# Patient Record
Sex: FEMALE
[De-identification: names, ages, dates, MRNs, and addresses within clinical notes are randomized per-mention and may not be internally consistent; named-entity substitution may affect disease eponyms.]

---

## 2017-01-04 ENCOUNTER — HOSPITAL (OUTPATIENT)
Dept: OTHER | Age: 57
End: 2017-01-04
Attending: INTERNAL MEDICINE

## 2017-01-04 ENCOUNTER — HOSPITAL (OUTPATIENT)
Dept: OTHER | Age: 57
End: 2017-01-04
Attending: EMERGENCY MEDICINE

## 2017-01-04 LAB
ANALYZER ANC (IANC): NORMAL
ANION GAP SERPL CALC-SCNC: 16 MMOL/L (ref 10–20)
BASOPHILS # BLD: 0 THOUSAND/MCL (ref 0–0.3)
BASOPHILS NFR BLD: 0 %
BNP SERPL-MCNC: 20 PG/ML
BUN SERPL-MCNC: 14 MG/DL (ref 10–20)
BUN/CREAT SERPL: 21 (ref 7–25)
CALCIUM SERPL-MCNC: 8.7 MG/DL (ref 8.4–10.2)
CHLORIDE: 103 MMOL/L (ref 98–107)
CO2 SERPL-SCNC: 25 MMOL/L (ref 21–32)
CREAT SERPL-MCNC: 0.66 MG/DL (ref 0.51–0.95)
D DIMER PPP FEU-MCNC: 0.24 MG/L FEU
DIFFERENTIAL METHOD BLD: NORMAL
EOSINOPHIL # BLD: 0.1 THOUSAND/MCL (ref 0.1–0.5)
EOSINOPHIL NFR BLD: 1 %
ERYTHROCYTE [DISTWIDTH] IN BLOOD: 12.5 % (ref 11–15)
GLUCOSE SERPL-MCNC: 97 MG/DL (ref 65–99)
HEMATOCRIT: 40.6 % (ref 36–46.5)
HGB BLD-MCNC: 13.7 GM/DL (ref 12–15.5)
LYMPHOCYTES # BLD: 1.9 THOUSAND/MCL (ref 1–4)
LYMPHOCYTES NFR BLD: 28 %
MCH RBC QN AUTO: 30.6 PG (ref 26–34)
MCHC RBC AUTO-ENTMCNC: 33.7 GM/DL (ref 32–36.5)
MCV RBC AUTO: 90.6 FL (ref 78–100)
MONOCYTES # BLD: 0.3 THOUSAND/MCL (ref 0.3–0.9)
MONOCYTES NFR BLD: 4 %
NEUTROPHILS # BLD: 4.8 THOUSAND/MCL (ref 1.8–7.7)
NEUTROPHILS NFR BLD: 67 %
NEUTS SEG NFR BLD: NORMAL %
PERCENT NRBC: NORMAL
PLATELET # BLD: 271 THOUSAND/MCL (ref 140–450)
POTASSIUM SERPL-SCNC: 3.6 MMOL/L (ref 3.4–5.1)
RBC # BLD: 4.48 MILLION/MCL (ref 4–5.2)
SODIUM SERPL-SCNC: 140 MMOL/L (ref 135–145)
TROPONIN I SERPL HS-MCNC: <0.02 NG/ML
WBC # BLD: 7 THOUSAND/MCL (ref 4.2–11)

## 2017-01-05 ENCOUNTER — DIAGNOSTIC TRANS (OUTPATIENT)
Dept: OTHER | Age: 57
End: 2017-01-05

## 2017-01-05 LAB
ALBUMIN SERPL-MCNC: 4.1 GM/DL (ref 3.6–5.1)
ALBUMIN/GLOB SERPL: 1.3 {RATIO} (ref 1–2.4)
ALP SERPL-CCNC: 64 UNIT/L (ref 45–117)
ALT SERPL-CCNC: 74 UNIT/L
ANALYZER ANC (IANC): NORMAL
ANION GAP SERPL CALC-SCNC: 14 MMOL/L (ref 10–20)
AST SERPL-CCNC: 35 UNIT/L
BILIRUB SERPL-MCNC: 0.7 MG/DL (ref 0.2–1)
BUN SERPL-MCNC: 13 MG/DL (ref 10–20)
BUN/CREAT SERPL: 20 (ref 7–25)
CALCIUM SERPL-MCNC: 8.6 MG/DL (ref 8.4–10.2)
CHLORIDE: 105 MMOL/L (ref 98–107)
CHOLEST SERPL-MCNC: 230 MG/DL (ref 100–200)
CHOLEST/HDLC SERPL: 7.2 {RATIO}
CO2 SERPL-SCNC: 26 MMOL/L (ref 21–32)
CREAT SERPL-MCNC: 0.65 MG/DL (ref 0.51–0.95)
ERYTHROCYTE [DISTWIDTH] IN BLOOD: 12.4 % (ref 11–15)
GLOBULIN SER-MCNC: 3.2 GM/DL (ref 2–4)
GLUCOSE SERPL-MCNC: 112 MG/DL (ref 65–99)
GLYCOHEMOGLOBIN: 5.7 % (ref 4.5–5.6)
HDLC SERPL-MCNC: 32 MG/DL
HEMATOCRIT: 39 % (ref 36–46.5)
HGB BLD-MCNC: 13 GM/DL (ref 12–15.5)
LDLC SERPL CALC-MCNC: 152 MG/DL
MAGNESIUM SERPL-MCNC: 2.2 MG/DL (ref 1.7–2.4)
MCH RBC QN AUTO: 30.4 PG (ref 26–34)
MCHC RBC AUTO-ENTMCNC: 33.3 GM/DL (ref 32–36.5)
MCV RBC AUTO: 91.1 FL (ref 78–100)
NONHDLC SERPL-MCNC: 198 MG/DL
PLATELET # BLD: 260 THOUSAND/MCL (ref 140–450)
POTASSIUM SERPL-SCNC: 3.9 MMOL/L (ref 3.4–5.1)
PROT SERPL-MCNC: 7.3 GM/DL (ref 6.4–8.2)
RBC # BLD: 4.28 MILLION/MCL (ref 4–5.2)
SODIUM SERPL-SCNC: 141 MMOL/L (ref 135–145)
TRIGL SERPL-MCNC: 231 MG/DL
TROPONIN I SERPL HS-MCNC: <0.02 NG/ML
WBC # BLD: 7.2 THOUSAND/MCL (ref 4.2–11)

## 2017-08-30 ENCOUNTER — HOSPITAL (OUTPATIENT)
Dept: OTHER | Age: 57
End: 2017-08-30
Attending: EMERGENCY MEDICINE

## 2019-06-11 ENCOUNTER — OFFICE VISIT (OUTPATIENT)
Dept: FAMILY MEDICINE CLINIC | Facility: CLINIC | Age: 59
End: 2019-06-11
Payer: COMMERCIAL

## 2019-06-11 VITALS
TEMPERATURE: 98 F | DIASTOLIC BLOOD PRESSURE: 80 MMHG | SYSTOLIC BLOOD PRESSURE: 170 MMHG | WEIGHT: 234 LBS | BODY MASS INDEX: 33.88 KG/M2 | HEIGHT: 69.5 IN | HEART RATE: 64 BPM

## 2019-06-11 DIAGNOSIS — N64.52 DISCHARGE FROM BREAST: Primary | ICD-10-CM

## 2019-06-11 DIAGNOSIS — Z00.00 ANNUAL PHYSICAL EXAM: ICD-10-CM

## 2019-06-11 PROCEDURE — 99212 OFFICE O/P EST SF 10 MIN: CPT | Performed by: FAMILY MEDICINE

## 2019-06-11 PROCEDURE — 99203 OFFICE O/P NEW LOW 30 MIN: CPT | Performed by: FAMILY MEDICINE

## 2019-06-11 RX ORDER — HYDROCHLOROTHIAZIDE 12.5 MG/1
12.5 TABLET ORAL DAILY
COMMUNITY
End: 2020-07-02

## 2019-06-18 NOTE — PROGRESS NOTES
HPI:    Patient ID: Basilia Louis is a 61year old female. As in nurses note. Patient also need a blood test.   Did not take her bp medication. States bp is normal usually.    Has a history of prolactinoma      Review of Systems   Constitutional: Negative ordered in this encounter       Imaging & Referrals:  SURGERY - INTERNAL  OP REFERRAL TO [facility] TELEPHONE COLON SCREEN  [facility] (CPT=[id number])       HS#[id number]

## 2019-07-02 ENCOUNTER — TELEPHONE (OUTPATIENT)
Dept: FAMILY MEDICINE CLINIC | Facility: CLINIC | Age: 59
End: 2019-07-02

## 2019-07-02 DIAGNOSIS — E66.3 PATIENT OVERWEIGHT: ICD-10-CM

## 2019-07-02 DIAGNOSIS — Z00.00 ANNUAL PHYSICAL EXAM: Primary | ICD-10-CM

## 2019-07-05 NOTE — TELEPHONE ENCOUNTER
Reason for call changed to forms. Please update status of order request.  Being done at outside facility, paper order will be required.

## 2020-07-02 ENCOUNTER — LAB ENCOUNTER (OUTPATIENT)
Dept: LAB | Facility: HOSPITAL | Age: 60
End: 2020-07-02
Attending: FAMILY MEDICINE
Payer: COMMERCIAL

## 2020-07-02 ENCOUNTER — OFFICE VISIT (OUTPATIENT)
Dept: FAMILY MEDICINE CLINIC | Facility: CLINIC | Age: 60
End: 2020-07-02
Payer: COMMERCIAL

## 2020-07-02 VITALS
BODY MASS INDEX: 35.47 KG/M2 | HEART RATE: 70 BPM | DIASTOLIC BLOOD PRESSURE: 83 MMHG | WEIGHT: 245 LBS | SYSTOLIC BLOOD PRESSURE: 140 MMHG | HEIGHT: 69.5 IN | TEMPERATURE: 99 F

## 2020-07-02 DIAGNOSIS — Z12.31 ENCOUNTER FOR SCREENING MAMMOGRAM FOR BREAST CANCER: ICD-10-CM

## 2020-07-02 DIAGNOSIS — E66.3 PATIENT OVERWEIGHT: ICD-10-CM

## 2020-07-02 DIAGNOSIS — E66.9 DIABETES MELLITUS TYPE 2 IN OBESE (HCC): ICD-10-CM

## 2020-07-02 DIAGNOSIS — Z12.11 ENCOUNTER FOR SCREENING COLONOSCOPY: Primary | ICD-10-CM

## 2020-07-02 DIAGNOSIS — Z00.00 ANNUAL PHYSICAL EXAM: ICD-10-CM

## 2020-07-02 DIAGNOSIS — E11.69 DIABETES MELLITUS TYPE 2 IN OBESE (HCC): ICD-10-CM

## 2020-07-02 LAB
ALBUMIN SERPL-MCNC: 3.8 G/DL (ref 3.4–5)
ALBUMIN/GLOB SERPL: 0.9 {RATIO} (ref 1–2)
ALP LIVER SERPL-CCNC: 93 U/L (ref 46–118)
ALT SERPL-CCNC: 78 U/L (ref 13–56)
ANION GAP SERPL CALC-SCNC: 6 MMOL/L (ref 0–18)
AST SERPL-CCNC: 30 U/L (ref 15–37)
BASOPHILS # BLD AUTO: 0.04 X10(3) UL (ref 0–0.2)
BASOPHILS NFR BLD AUTO: 0.5 %
BILIRUB SERPL-MCNC: 0.5 MG/DL (ref 0.1–2)
BUN BLD-MCNC: 14 MG/DL (ref 7–18)
BUN/CREAT SERPL: 17.1 (ref 10–20)
CALCIUM BLD-MCNC: 9 MG/DL (ref 8.5–10.1)
CHLORIDE SERPL-SCNC: 103 MMOL/L (ref 98–112)
CHOLEST SMN-MCNC: 236 MG/DL (ref ?–200)
CO2 SERPL-SCNC: 31 MMOL/L (ref 21–32)
CREAT BLD-MCNC: 0.82 MG/DL (ref 0.55–1.02)
DEPRECATED RDW RBC AUTO: 42.5 FL (ref 35.1–46.3)
EOSINOPHIL # BLD AUTO: 0.11 X10(3) UL (ref 0–0.7)
EOSINOPHIL NFR BLD AUTO: 1.2 %
ERYTHROCYTE [DISTWIDTH] IN BLOOD BY AUTOMATED COUNT: 12.3 % (ref 11–15)
EST. AVERAGE GLUCOSE BLD GHB EST-MCNC: 146 MG/DL (ref 68–126)
GLOBULIN PLAS-MCNC: 4.2 G/DL (ref 2.8–4.4)
GLUCOSE BLD-MCNC: 143 MG/DL (ref 70–99)
HBA1C MFR BLD HPLC: 6.7 % (ref ?–5.7)
HCT VFR BLD AUTO: 43.7 % (ref 35–48)
HDLC SERPL-MCNC: 35 MG/DL (ref 40–59)
HGB BLD-MCNC: 14.5 G/DL (ref 12–16)
IMM GRANULOCYTES # BLD AUTO: 0.04 X10(3) UL (ref 0–1)
IMM GRANULOCYTES NFR BLD: 0.5 %
LDLC SERPL CALC-MCNC: 151 MG/DL (ref ?–100)
LYMPHOCYTES # BLD AUTO: 1.78 X10(3) UL (ref 1–4)
LYMPHOCYTES NFR BLD AUTO: 20.2 %
M PROTEIN MFR SERPL ELPH: 8 G/DL (ref 6.4–8.2)
MCH RBC QN AUTO: 31.3 PG (ref 26–34)
MCHC RBC AUTO-ENTMCNC: 33.2 G/DL (ref 31–37)
MCV RBC AUTO: 94.2 FL (ref 80–100)
MONOCYTES # BLD AUTO: 0.47 X10(3) UL (ref 0.1–1)
MONOCYTES NFR BLD AUTO: 5.3 %
NEUTROPHILS # BLD AUTO: 6.38 X10 (3) UL (ref 1.5–7.7)
NEUTROPHILS # BLD AUTO: 6.38 X10(3) UL (ref 1.5–7.7)
NEUTROPHILS NFR BLD AUTO: 72.3 %
NONHDLC SERPL-MCNC: 201 MG/DL (ref ?–130)
OSMOLALITY SERPL CALC.SUM OF ELEC: 293 MOSM/KG (ref 275–295)
PATIENT FASTING Y/N/NP: YES
PATIENT FASTING Y/N/NP: YES
PLATELET # BLD AUTO: 277 10(3)UL (ref 150–450)
POTASSIUM SERPL-SCNC: 4.1 MMOL/L (ref 3.5–5.1)
RBC # BLD AUTO: 4.64 X10(6)UL (ref 3.8–5.3)
SODIUM SERPL-SCNC: 140 MMOL/L (ref 136–145)
TRIGL SERPL-MCNC: 251 MG/DL (ref 30–149)
TSI SER-ACNC: 2.33 MIU/ML (ref 0.36–3.74)
VIT B12 SERPL-MCNC: 415 PG/ML (ref 193–986)
VLDLC SERPL CALC-MCNC: 50 MG/DL (ref 0–30)
WBC # BLD AUTO: 8.8 X10(3) UL (ref 4–11)

## 2020-07-02 PROCEDURE — 82306 VITAMIN D 25 HYDROXY: CPT

## 2020-07-02 PROCEDURE — 84443 ASSAY THYROID STIM HORMONE: CPT

## 2020-07-02 PROCEDURE — 83036 HEMOGLOBIN GLYCOSYLATED A1C: CPT

## 2020-07-02 PROCEDURE — 80053 COMPREHEN METABOLIC PANEL: CPT

## 2020-07-02 PROCEDURE — 80061 LIPID PANEL: CPT

## 2020-07-02 PROCEDURE — 85025 COMPLETE CBC W/AUTO DIFF WBC: CPT

## 2020-07-02 PROCEDURE — 36415 COLL VENOUS BLD VENIPUNCTURE: CPT

## 2020-07-02 PROCEDURE — 82607 VITAMIN B-12: CPT

## 2020-07-02 PROCEDURE — 99396 PREV VISIT EST AGE 40-64: CPT | Performed by: FAMILY MEDICINE

## 2020-07-02 RX ORDER — AMLODIPINE BESYLATE AND BENAZEPRIL HYDROCHLORIDE 5; 20 MG/1; MG/1
1 CAPSULE ORAL DAILY
Qty: 90 CAPSULE | Refills: 1 | Status: SHIPPED | OUTPATIENT
Start: 2020-07-02 | End: 2021-02-05

## 2020-07-02 RX ORDER — HYDROCHLOROTHIAZIDE 12.5 MG/1
12.5 TABLET ORAL DAILY
Qty: 90 TABLET | Refills: 1 | Status: SHIPPED | OUTPATIENT
Start: 2020-07-02

## 2020-07-03 LAB — 25(OH)D3 SERPL-MCNC: 17.4 NG/ML (ref 30–100)

## 2020-07-03 RX ORDER — CHOLECALCIFEROL (VITAMIN D3) 125 MCG
1 CAPSULE ORAL DAILY
Qty: 90 CAPSULE | Refills: 3 | Status: SHIPPED | OUTPATIENT
Start: 2020-07-03 | End: 2020-08-02

## 2020-07-03 NOTE — PROGRESS NOTES
HPI:    Patient ID: Daniel Giles is a 61year old female.     Patient is here for annual physical. 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   Patient that she was fully She is alert and oriented to person, place, and time. She has normal reflexes. Skin: Skin is dry.               ASSESSMENT/PLAN:   Encounter for screening colonoscopy  (primary encounter diagnosis)  Encounter for screening mammogram for breast cancer  [place]

## 2021-02-05 RX ORDER — AMLODIPINE BESYLATE AND BENAZEPRIL HYDROCHLORIDE 5; 20 MG/1; MG/1
1 CAPSULE ORAL DAILY
Qty: 90 CAPSULE | Refills: 1 | Status: SHIPPED | OUTPATIENT
Start: 2021-02-05

## 2022-06-02 RX ORDER — AMLODIPINE BESYLATE AND BENAZEPRIL HYDROCHLORIDE 5; 20 MG/1; MG/1
1 CAPSULE ORAL DAILY
Qty: 90 CAPSULE | Refills: 1 | OUTPATIENT
Start: 2022-06-02

## 2023-12-28 ENCOUNTER — NURSE TRIAGE (OUTPATIENT)
Dept: FAMILY MEDICINE CLINIC | Facility: CLINIC | Age: 63
End: 2023-12-28

## 2023-12-28 ENCOUNTER — TELEPHONE (OUTPATIENT)
Dept: FAMILY MEDICINE CLINIC | Facility: CLINIC | Age: 63
End: 2023-12-28

## 2023-12-28 DIAGNOSIS — N63.0 MASS OF BREAST, UNSPECIFIED LATERALITY: Primary | ICD-10-CM

## 2023-12-28 DIAGNOSIS — N63.10 MASS OF RIGHT BREAST, UNSPECIFIED QUADRANT: ICD-10-CM

## 2023-12-28 NOTE — TELEPHONE ENCOUNTER
Pt calling for [facility] order, found lump in right breat , have not been seen since 7/2020, advised she's a new Pt   Appt made for misty

## 2024-01-22 ENCOUNTER — TELEPHONE (OUTPATIENT)
Dept: FAMILY MEDICINE CLINIC | Facility: CLINIC | Age: 64
End: 2024-01-22

## 2024-01-22 NOTE — TELEPHONE ENCOUNTER
Patient is scheduled for pre-op clearance 01/25 and would like to complete pre-op testing prior to appointment. Patient requesting orders for EKG, Labs, and urine analysis faxed to [facility]. Patient will call back with fax number. Patient was advised message will be sent to Dr. Trujillo with her request. Please advise.

## 2024-01-23 ENCOUNTER — NURSE TRIAGE (OUTPATIENT)
Dept: FAMILY MEDICINE CLINIC | Facility: CLINIC | Age: 64
End: 2024-01-23

## 2024-01-23 DIAGNOSIS — E11.9 DIABETES MELLITUS TYPE 2 IN NONOBESE (HCC): ICD-10-CM

## 2024-01-23 DIAGNOSIS — Z01.818 PREOP EXAMINATION: ICD-10-CM

## 2024-01-23 DIAGNOSIS — Z00.00 ANNUAL PHYSICAL EXAM: Primary | ICD-10-CM

## 2024-01-23 NOTE — TELEPHONE ENCOUNTER
Patient called to advise she has tested positive for Covid, and will be on her 5th day this Wednesday. Patient stated she can do a video visit or she can come in to her scheduled appointment this Thursday but will like to get the Doctor's thought. Also requesting labs for her upcoming appointment.

## 2024-01-23 NOTE — TELEPHONE ENCOUNTER
EKG and lab orders was faxed to [facility] at [phone] and received confirmation.    Patient informed via voicemail

## 2024-01-23 NOTE — TELEPHONE ENCOUNTER
Please order pre-op testing prior to appointment. Patient requesting orders for EKG, Labs, and urine analysis faxed to [facility] lab.   Patient will call back with fax number.

## 2024-01-23 NOTE — TELEPHONE ENCOUNTER
BINH Trujillo:  Patient had to postpone surgery. She states it will be rescheduled around March 7th.    Covid postive tested Today.    Has started flu like symptoms chilld body aches for one day. On Saturday.     Now has Stuffy head, cough, hoarseness.     Drinking soups and water.     Discussed home care supportive care.   Isolate 5 days.    Preop exam rescheduled for 2/20/24. Explained preop testing has been ordered (labs, xray, EKG) but would be better to do within 30 days of surgery date, and wait until covid symptoms are resolved.   Patient verbalized understanding.      Patient to call back if any new concerns/questions. Patient verbalized understanding.      Reason for Disposition   [1] COVID-19 diagnosed by positive lab test (e.g., PCR, rapid self-test kit) AND [2] mild symptoms (e.g., cough, fever, others) AND [3] no complications or SOB    Protocols used: Coronavirus (COVID-19) Diagnosed or [id number]

## 2024-01-23 NOTE — TELEPHONE ENCOUNTER
Patient called back with fax # to send order to   [phone]    Please advise patient when faxed orders are confirmed.

## 2024-02-06 ENCOUNTER — TELEPHONE (OUTPATIENT)
Dept: FAMILY MEDICINE CLINIC | Facility: CLINIC | Age: 64
End: 2024-02-06

## 2024-02-07 ENCOUNTER — TELEPHONE (OUTPATIENT)
Dept: FAMILY MEDICINE CLINIC | Facility: CLINIC | Age: 64
End: 2024-02-07

## 2024-02-07 NOTE — TELEPHONE ENCOUNTER
Patient called and is checking the status of xray to be faxed over to [facility].  Please call patient when it is faxed

## 2024-02-07 NOTE — TELEPHONE ENCOUNTER
Pt would like to confirm that the chest x-ray order was faxed to [facility]. Per the patient they have not received the order yet. Please, fax to [phone].

## 2024-02-07 NOTE — TELEPHONE ENCOUNTER
Spoke with patient, clearance appointment is scheduled for 2/20. Faxed over chest xray order to [phone]

## 2024-02-08 ENCOUNTER — MED REC SCAN ONLY (OUTPATIENT)
Dept: FAMILY MEDICINE CLINIC | Facility: CLINIC | Age: 64
End: 2024-02-08

## 2024-02-08 NOTE — TELEPHONE ENCOUNTER
Chest xray was faxed 2/6 and patient was notified. Refaxed twice today. Will give patient a call to inform her

## 2024-02-12 NOTE — TELEPHONE ENCOUNTER
Left message on patient voice that  her Xray order was fax twice by Ayana VILLANUEVA and Today by me to the number list below.   If, Patient call back please let her know she can print out on her MyChart or come and pick it up at   [place].  thanks

## 2024-02-13 ENCOUNTER — MED REC SCAN ONLY (OUTPATIENT)
Dept: FAMILY MEDICINE CLINIC | Facility: CLINIC | Age: 64
End: 2024-02-13

## 2024-02-13 ENCOUNTER — TELEPHONE (OUTPATIENT)
Dept: FAMILY MEDICINE CLINIC | Facility: CLINIC | Age: 64
End: 2024-02-13

## 2024-02-13 NOTE — TELEPHONE ENCOUNTER
Left message on voicemail for patient to schedule appointment with Dr. Trujillo for abnormal test results Hgab1c 12.1  from [facility] .  PSR please try to schedule patient.  thanks

## 2024-02-20 ENCOUNTER — OFFICE VISIT (OUTPATIENT)
Dept: FAMILY MEDICINE CLINIC | Facility: CLINIC | Age: 64
End: 2024-02-20

## 2024-02-20 VITALS
HEIGHT: 68 IN | HEART RATE: 72 BPM | WEIGHT: 233.38 LBS | OXYGEN SATURATION: 94 % | DIASTOLIC BLOOD PRESSURE: 76 MMHG | BODY MASS INDEX: 35.37 KG/M2 | RESPIRATION RATE: 18 BRPM | SYSTOLIC BLOOD PRESSURE: 136 MMHG

## 2024-02-20 DIAGNOSIS — E66.9 DIABETES MELLITUS TYPE 2 IN OBESE (HCC): ICD-10-CM

## 2024-02-20 DIAGNOSIS — E11.69 DIABETES MELLITUS TYPE 2 IN OBESE (HCC): ICD-10-CM

## 2024-02-20 DIAGNOSIS — R94.31 ABNORMAL EKG: Primary | ICD-10-CM

## 2024-02-20 PROCEDURE — 99204 OFFICE O/P NEW MOD 45 MIN: CPT | Performed by: FAMILY MEDICINE

## 2024-02-20 RX ORDER — ROSUVASTATIN CALCIUM 20 MG/1
20 TABLET, COATED ORAL NIGHTLY
Qty: 90 TABLET | Refills: 1 | Status: SHIPPED | OUTPATIENT
Start: 2024-02-20 | End: 2024-06-19

## 2024-02-20 RX ORDER — FLUCONAZOLE 150 MG/1
150 TABLET ORAL ONCE
Qty: 1 TABLET | Refills: 0 | Status: SHIPPED | OUTPATIENT
Start: 2024-02-20 | End: 2024-02-20

## 2024-02-20 RX ORDER — GLIPIZIDE 5 MG/1
5 TABLET, FILM COATED, EXTENDED RELEASE ORAL DAILY
Qty: 90 TABLET | Refills: 1 | Status: SHIPPED | OUTPATIENT
Start: 2024-02-20

## 2024-02-20 RX ORDER — INSULIN GLARGINE 100 [IU]/ML
30 INJECTION, SOLUTION SUBCUTANEOUS NIGHTLY
Qty: 10 EACH | Refills: 1 | Status: SHIPPED | OUTPATIENT
Start: 2024-02-20

## 2024-02-20 NOTE — PROGRESS NOTES
Subjective:   Patient ID: Britney Benjamin is a 63 year old female.    HPI  Here for preop clearance   No chest pain no shortness of breath   Uncontrolled diabetes   Abnormal EKG     History/Other:   Review of Systems  Constitutional: Negative.  Negative for activity change, appetite change, diaphoresis and fatigue.     Respiratory: Negative.  Negative for apnea, cough, chest tightness and shortness of breath.    Cardiovascular: Negative.  Negative for chest pain, palpitations and leg swelling.   Gastrointestinal: Negative.  Negative for abdominal pain.   Skin: Negative.             Current Outpatient Medications   Medication Sig Dispense Refill    metFORMIN 500 MG Oral Tab Take 2 tablets (1,000 mg total) by mouth 2 (two) times daily with meals. 360 tablet 1    fluconazole (DIFLUCAN) 150 MG Oral Tab Take 1 tablet (150 mg total) by mouth once for 1 dose. 1 tablet 0    insulin glargine (LANTUS SOLOSTAR) 100 UNIT/ML Subcutaneous Solution Pen-injector Inject 30 Units into the skin nightly. 10 each 1    glipiZIDE ER 5 MG Oral Tablet 24 Hr Take 1 tablet (5 mg total) by mouth daily. 90 tablet 1    Amlodipine Besy-Benazepril HCl 5-10 MG Oral Cap Take 1 capsule by mouth daily.      AMLODIPINE BESY-BENAZEPRIL HCL 5-20 MG Oral Cap TAKE 1 CAPSULE BY MOUTH DAILY (Patient not taking: Reported on 2/20/2024) 90 capsule 1    hydrochlorothiazide 12.5 MG Oral Tab Take 1 tablet (12.5 mg total) by mouth daily. (Patient not taking: Reported on 2/20/2024) 90 tablet 1    metFORMIN HCl 500 MG Oral Tab Take 1 tablet (500 mg total) by mouth 2 (two) times daily with meals. (Patient not taking: Reported on 2/20/2024) 180 tablet 1     Allergies:No Known Allergies    Objective:   Physical Exam  Constitutional:       Appearance: She is well-developed.   Cardiovascular:      Rate and Rhythm: Normal rate and regular rhythm.      Heart sounds: Normal heart sounds.   Pulmonary:      Effort: Pulmonary effort is normal.      Breath sounds: Normal breath  sounds.   Abdominal:      General: Bowel sounds are normal.      Palpations: Abdomen is soft.   Skin:     General: Skin is warm and dry.   Neurological:      Mental Status: She is alert.      Deep Tendon Reflexes: Reflexes are normal and symmetric.         Assessment & Plan:   1. Abnormal EKG    2. Diabetes mellitus type 2 in obese (HCC)    Lianne scan   Endocrinology   Also needs to see cardiologist     No orders of the defined types were placed in this encounter.      Meds This Visit:  Requested Prescriptions     Signed Prescriptions Disp Refills    metFORMIN 500 MG Oral Tab 360 tablet 1     Sig: Take 2 tablets (1,000 mg total) by mouth 2 (two) times daily with meals.    fluconazole (DIFLUCAN) 150 MG Oral Tab 1 tablet 0     Sig: Take 1 tablet (150 mg total) by mouth once for 1 dose.    insulin glargine (LANTUS SOLOSTAR) 100 UNIT/ML Subcutaneous Solution Pen-injector 10 each 1     Sig: Inject 30 Units into the skin nightly.    glipiZIDE ER 5 MG Oral Tablet 24 Hr 90 tablet 1     Sig: Take 1 tablet (5 mg total) by mouth daily.       Imaging & Referrals:  ENDOCRINOLOGY - INTERNAL  CARD NUC STRESS TEST LEXISCAN (GMP=[id number]/)

## 2024-02-21 ENCOUNTER — TELEPHONE (OUTPATIENT)
Dept: ENDOCRINOLOGY CLINIC | Facility: CLINIC | Age: 64
End: 2024-02-21

## 2024-02-21 ENCOUNTER — TELEPHONE (OUTPATIENT)
Dept: FAMILY MEDICINE CLINIC | Facility: CLINIC | Age: 64
End: 2024-02-21

## 2024-02-21 NOTE — TELEPHONE ENCOUNTER
Patient returning missed call, tried transferring, but patient hung up before I good transfer. Please call at [phone],thanks.

## 2024-02-21 NOTE — TELEPHONE ENCOUNTER
Patient called to request stress test orders faxed to below: Appointment is 2/22 at 1:30pm. Patient asking if a voice message can be left to confirm it has been faxed. Patient states she has been playing phone tag for a couple days regarding this.     Fax # [phone]  Dr. Hua [facility] Central Scheduling  Phone number [phone]

## 2024-02-21 NOTE — TELEPHONE ENCOUNTER
Pt states tomorrow does not work for an appt but late Friday or week of March 4th please follow up

## 2024-02-21 NOTE — TELEPHONE ENCOUNTER
LM to call clinic to advise if patient can make apt tomorrow at 8am with CARA Smalls (apt will be at [facility] [address])

## 2024-02-21 NOTE — TELEPHONE ENCOUNTER
Dr. Johnson, patient is asking if visit could possibly be a video visit? 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 She can do a video visit between her own appointments Thursday (you have a couple 15 minute spots open) or she can ask to have off on Friday to come in for appt with Luna.      Her surgery is 3/7/24, so I told her appt really needs to be before that week.

## 2024-02-21 NOTE — TELEPHONE ENCOUNTER
Corrie from [facility] calling about orders for the stress test.     I told her they were faxed already however the fax number was incorrect.     Can we send another fax with the orders to [phone]

## 2024-02-21 NOTE — TELEPHONE ENCOUNTER
Please call patient to schedule appt this week.  Do APNs or myself have any cancellation spots? If not let me know and I'll find a spot. Thanks.

## 2024-02-23 ENCOUNTER — PATIENT MESSAGE (OUTPATIENT)
Dept: ENDOCRINOLOGY CLINIC | Facility: CLINIC | Age: 64
End: 2024-02-23

## 2024-02-23 ENCOUNTER — OFFICE VISIT (OUTPATIENT)
Dept: ENDOCRINOLOGY CLINIC | Facility: CLINIC | Age: 64
End: 2024-02-23
Payer: COMMERCIAL

## 2024-02-23 VITALS
DIASTOLIC BLOOD PRESSURE: 75 MMHG | WEIGHT: 235 LBS | HEIGHT: 67.99 IN | HEART RATE: 68 BPM | BODY MASS INDEX: 35.61 KG/M2 | SYSTOLIC BLOOD PRESSURE: 139 MMHG

## 2024-02-23 DIAGNOSIS — E11.65 UNCONTROLLED TYPE 2 DIABETES MELLITUS WITH HYPERGLYCEMIA (HCC): Primary | ICD-10-CM

## 2024-02-23 LAB
GLUCOSE BLOOD: 368
TEST STRIP LOT #: NORMAL NUMERIC

## 2024-02-23 PROCEDURE — 99204 OFFICE O/P NEW MOD 45 MIN: CPT

## 2024-02-23 PROCEDURE — 82947 ASSAY GLUCOSE BLOOD QUANT: CPT

## 2024-02-23 RX ORDER — PEN NEEDLE, DIABETIC 32GX 5/32"
NEEDLE, DISPOSABLE MISCELLANEOUS
Qty: 100 EACH | Refills: 0 | Status: SHIPPED | OUTPATIENT
Start: 2024-02-23

## 2024-02-23 RX ORDER — ACYCLOVIR 400 MG/1
1 TABLET ORAL
Qty: 3 EACH | Refills: 3 | Status: SHIPPED | OUTPATIENT
Start: 2024-02-23

## 2024-02-23 NOTE — PROGRESS NOTES
Name: Britney Benjamin  Date: 2/23/2024    Referring Physician: Sandra Trujillo    HISTORY OF PRESENT ILLNESS   Britney Benjamin is a 63 year old female who presents for consult for diabetes mellitus     Patient is here for consult for new diagnosis of diabetes. She is needing to undergo lumpectomy for breast cancer but during pre op workup was found to have HgA1c of 12% with no known history of diabetes.     Diabetes History:  Diagnosed- New diagnosis   Patient has not had hospitalizations for blood sugar issues    Prior glycohemoglobin were 12% 1/2024   Glucose in clinic today - 368 mg/dl     Dietary compliance: Fair- eatings 1-3 meals daily. Has not followed any specific diet up to this point. Has been recently trying to follow lower protein diet.     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 Glucose was >300 in clinic this morning.     Current DM Regimen:  Glipizide ER 5mg PO daily - started 2 days ago  Lantus 30 units subcutaneous daily- she was nervous about hypoglycemia so has not yet started.       Modifying factors:  Medication adherence: Yes   Recent steroids, illness or infections: No       REVIEW OF SYSTEMS  Eyes: Diabetic retinopathy present: None known             Most recent visit to eye doctor in last 12 months: Yes- seeing every 6 months but has not yet had dilated diabetes eye exam- new diagnosis.     CV: Cardiovascular disease present: No         Hypertension present: Yes         Hyperlipidemia present: Yes         Peripheral Vascular Disease present: No    : Nephropathy present: No    Neuro: Neuropathy present: Yes- mild tingling/numbness in toes     Skin: Infection or ulceration: No    Osteoporosis: No    Thyroid disease: No      Medications:     Current Outpatient Medications:     Continuous Blood Gluc Sensor (DEXCOM G7 SENSOR) Does not apply Misc, 1 each Every 10 days., Disp: 3  each, Rfl: 3    Insulin Pen Needle (BD PEN NEEDLE SANDRA U/F) 32G X 4 MM Does not apply Misc, Use with injection daily, Disp: 100 each, Rfl: 0    glipiZIDE ER 5 MG Oral Tablet 24 Hr, Take 1 tablet (5 mg total) by mouth daily., Disp: 90 tablet, Rfl: 1    insulin glargine (LANTUS SOLOSTAR) 100 UNIT/ML Subcutaneous Solution Pen-injector, Inject 30 Units into the skin nightly., Disp: 10 each, Rfl: 1    rosuvastatin (CRESTOR) 20 MG Oral Tab, Take 1 tablet (20 mg total) by mouth nightly. For cholesterol., Disp: 90 tablet, Rfl: 1    Amlodipine Besy-Benazepril HCl 5-10 MG Oral Cap, Take 1 capsule by mouth daily., Disp: , Rfl:      Allergies:   No Known Allergies    Social History:   Social History     Socioeconomic History    Marital status: Single   Tobacco Use    Smoking status: Never    Smokeless tobacco: Never   Vaping Use    Vaping Use: Never used   Substance and Sexual Activity    Alcohol use: Yes    Drug use: Never       Medical History:   Past Medical History:   Diagnosis Date    Diabetes (HCC)     Essential hypertension     Obesity        Surgical history:   History reviewed. No pertinent surgical history.      PHYSICAL EXAM  Vitals:    02/23/24 0731   BP: 139/75   Pulse: 68   Weight: 235 lb (106.6 kg)   Height: 5' 7.99\" (1.727 m)       General Appearance:  alert, well developed, in no acute distress  Eyes:  normal conjunctivae, sclera, and normal pupils  Neck: Trachea midline: Normal  Back: no kyphosis or back tenderness  Lymph Nodes:  No abnormal nodes noted  Musculoskeletal:  normal muscle strength and tone  Skin:  normal moisture and skin texture  Hair & Nails:  normal scalp hair     Hematologic:  no excessive bruising  Neuro:  sensory grossly intact and motor grossly intact.  Psychiatric:  oriented to time, self, and place  Nutritional:  no abnormal weight gain or loss      ASSESSMENT/PLAN:    Diabetes mellitus type 2 uncontrolled  -HgA1c- 12%  -Reviewed ABC's of diabetes   - Reviewed pathogenesis of diabetes.    - Reviewed importance of good glycemic control to prevent microvascular and macrovascular complications including nephropathy, neuropathy, retinopathy, and cardiovascular disease.  - Reviewed importance of SBGM- check glucose 3 daily   - Reviewed target glucose goals for patient  fasting and <180 post prandially   - Reviewed importance of following diabetic diet- recommended 135 grams of CHO per day or 45 grams per meal.   - Provided patient education materials    -Discussed referral to dietician. She does have upcoming appt with nutritionist. Discussed also nutritionist with integrative medicine clinic.     - Continue Glipizide ER 5mg PO daily. Reviewed symptoms and management of hypoglycemia.     -Start Lantus 20 units subcutaneous daily. Increase by 4 units every 3 days until fasting glucose is <150. Lengthy discussion about need for insulin to improve sugars prior to surgery and she is agreeable.     -Start Dexcom G7- sensor started in clinic today   -Will plan to review CGM data in the next 4-5 days and adjust insulin accordingly.     -normotensive   - Lipids 2/2024- significantly elevated triglycerides- >400. Was started on statin.   - no nephropathy   - Reviewed importance of yearly optho follow up for dilated diabetes eye exam      Return in about 2 months (around 4/23/2024).Review CGM data in 4-5 days  2/23/2024  JUSTINE Tran    A total of  40 minutes was spent on obtaining history, reviewing pertinent imaging/labs and specialists notes, evaluating patient, providing multiple treatment options, reinforcing diet/exercise and compliance, and completing documentation.

## 2024-02-23 NOTE — PATIENT INSTRUCTIONS
Start Lantus 20 units subcutaneous daily. Increase by 4 units every 3 days until fasting sugars are <150    Continue Glipizide ER 5mg PO daily

## 2024-02-28 ENCOUNTER — TELEPHONE (OUTPATIENT)
Dept: FAMILY MEDICINE CLINIC | Facility: CLINIC | Age: 64
End: 2024-02-28

## 2024-02-28 NOTE — TELEPHONE ENCOUNTER
Abimbola from [facility] pre admission would like a call back to go over medical clearance information. Please advise

## 2024-02-28 NOTE — TELEPHONE ENCOUNTER
From: Britney Benjamin  To: Azul Knight  Sent: 2/23/2024 11:36 AM CST  Subject: BS    Went up then now reading 160s. Was feeling alittle jittery. Just was curious.

## 2024-02-29 NOTE — TELEPHONE ENCOUNTER
She can call me on my cell   Patient had normal stress test her diabetes is getting better. Appears to me that surgery is relatively urgent

## 2024-02-29 NOTE — TELEPHONE ENCOUNTER
H&P, labs, chest xray, EKG has been faxed twice. Unable to fax EKG with tracing. They will have to reach out to [facility] where all orders were completed, written on fax cover sheet as well as \"patient has been cleared for surgery\"

## 2024-02-29 NOTE — TELEPHONE ENCOUNTER
Onsite staff, can you please assist with faxing of H&P,labs,EKG with tracing and Chest Xray to [facility] fax # [phone]  Unable to print out the tracings.      Attempted to call number twice for Abimbola [phone]  . Phone rings then goes to busy signal.     Called [facility] [phone] , spoke to Surgical Nurse, who transferred call to [facility]. The [facility] is [phone]

## 2024-02-29 NOTE — TELEPHONE ENCOUNTER
Sandra Trujillo MD  Em Rn [id number] minutes ago (12:35 PM)       She is clear for surgery  Her sugars are much better controlled

## 2024-03-01 NOTE — TELEPHONE ENCOUNTER
I called and left message for Abimbola that MD note updated and can be seen in Care Everywhere. If something more needed I asked that Abimbola call us back.

## 2024-03-01 NOTE — TELEPHONE ENCOUNTER
Verified name and  of patient.    NEISHA Daily from [facility] to state that she has not received any faxes at this time. Fax number confirmed [phone].    She states that she can see Dr. Trujillo's office visit note from 24 via Care Everywhere and asks if Dr. Trujillo can addend the note to state that patient is cleared for surgery. If this is done and she can see it in Care Everywhere, there is no need for any further fax attempts.     She states that patient is scheduled for surgery on 3/7/24 and that latest they needed this completed is by 3/5/24.

## 2024-03-12 ENCOUNTER — MED REC SCAN ONLY (OUTPATIENT)
Dept: FAMILY MEDICINE CLINIC | Facility: CLINIC | Age: 64
End: 2024-03-12

## 2024-03-26 ENCOUNTER — MED REC SCAN ONLY (OUTPATIENT)
Dept: FAMILY MEDICINE CLINIC | Facility: CLINIC | Age: 64
End: 2024-03-26

## 2024-06-04 ENCOUNTER — TELEPHONE (OUTPATIENT)
Dept: FAMILY MEDICINE CLINIC | Facility: CLINIC | Age: 64
End: 2024-06-04

## 2024-06-04 ENCOUNTER — MED REC SCAN ONLY (OUTPATIENT)
Dept: FAMILY MEDICINE CLINIC | Facility: CLINIC | Age: 64
End: 2024-06-04

## 2024-06-07 ENCOUNTER — PATIENT MESSAGE (OUTPATIENT)
Dept: ENDOCRINOLOGY CLINIC | Facility: CLINIC | Age: 64
End: 2024-06-07

## 2024-06-07 RX ORDER — ACYCLOVIR 400 MG/1
1 TABLET ORAL
Qty: 3 EACH | Refills: 5 | Status: SHIPPED | OUTPATIENT
Start: 2024-06-07

## 2024-08-09 RX ORDER — GLIPIZIDE 5 MG/1
5 TABLET, FILM COATED, EXTENDED RELEASE ORAL DAILY
Qty: 90 TABLET | Refills: 0 | Status: SHIPPED | OUTPATIENT
Start: 2024-08-09

## 2024-08-09 NOTE — TELEPHONE ENCOUNTER
To pharmacy: Please tell needs to make an apt     MyChart message sent to patient to schedule an office visit with PCP.   Please make a phone attempt.

## 2024-08-09 NOTE — TELEPHONE ENCOUNTER
Please review.  Protocol failed / Has no protocol.        Component  Ref Range & Units 2/7/24  3:54 PM   External HGBA1C  0 - 5.6 % 12.1 High          Comp Metabolic Panel  Order: [id number]  Component  Ref Range & Units 2/7/24  3:54 PM   eGFRcr (CKD-EPI 2021)  >=60 mL/min/1.73 m² >90     Component  Ref Range & Units 2/7/24  4:16 PM   Microalbumin, Urine  0.0 - 1.8 mg/dL 1.7   Creatinine, Urine  mg/dL 84.9   Comment: R-No reference range established for this assay   Microalbumin/Creatinine Ratio  0 - 29 mg/g 20   Resulting Agency [facility]     Requested Prescriptions   Pending Prescriptions Disp Refills    GLIPIZIDE ER 5 MG Oral Tablet 24 Hr [Pharmacy Med Name: GLIPIZIDE ER 5MG TABLETS] 90 tablet 1     Sig: TAKE 1 TABLET(5 MG) BY MOUTH DAILY       Diabetes Medication Protocol Failed - 8/5/2024  3:22 PM        Failed - Last A1C < 7.5 and within past 6 months     Lab Results   Component Value Date    A1C 6.7 (H) 07/02/2020             Failed - Microalbumin procedure in past 12 months or taking ACE/ARB        Failed - EGFRCR or GFRNAA > 50     GFR Evaluation            Failed - GFR in the past 12 months        Passed - In person appointment or virtual visit in the past 6 mos or appointment in next 3 mos     Recent Outpatient Visits              5 months ago Uncontrolled type 2 diabetes mellitus with hyperglycemia (HCC)    [facility], [facility] [place]    Office Visit    5 months ago Abnormal EKG    [facility]    Office Visit    4 years ago Encounter for screening colonoscopy    [facility]    Office Visit    5 years ago Discharge from breast    [facility]    Office Visit    6 years ago Erythema    Dermatology - [facility] Sharon H, MD    Office Visit                            Recent Outpatient Visits              5 months ago Uncontrolled type 2 diabetes mellitus with hyperglycemia (HCC)    [facility], [facility], Azul Borrero APRN    Office Visit    5 months ago Abnormal EKG    [facility], [facility], Sandra Martinez MD    Office Visit    4 years ago Encounter for screening colonoscopy    [facility], [facility], Sandra Martinez MD    Office Visit    5 years ago Discharge from breast    [facility], [facility], Sandra Martinez MD    Office Visit    6 years ago Erythema    Dermatology - [facility] Meg Mchugh MD    Office Visit           no

## 2024-09-23 ENCOUNTER — TELEPHONE (OUTPATIENT)
Dept: FAMILY MEDICINE CLINIC | Facility: CLINIC | Age: 64
End: 2024-09-23

## 2024-09-23 NOTE — TELEPHONE ENCOUNTER
FW: Inpatient Discharge on 9/22/2024  Received: Today  Sandra Trujillo MD  P Em Rn Triage  She needs an apt  Please call her    Mychart sent.

## 2024-10-18 RX ORDER — GLIPIZIDE 5 MG/1
5 TABLET, FILM COATED, EXTENDED RELEASE ORAL DAILY
Qty: 90 TABLET | Refills: 0 | Status: SHIPPED | OUTPATIENT
Start: 2024-10-18

## 2024-10-18 NOTE — TELEPHONE ENCOUNTER
Please review. Protocol Failed; No Protocol    Future Appointments   Date Time Provider Department Center   11/25/2024 10:30 AM Azul Knight APRN [facility] message sent to patient to schedule an office visit with Provider and/or  Routed to Patient  for assistance with appointment.     Requested Prescriptions   Pending Prescriptions Disp Refills    GLIPIZIDE ER 5 MG Oral Tablet 24 Hr [Pharmacy Med Name: GLIPIZIDE ER 5MG TABLETS] 90 tablet 0     Sig: TAKE 1 TABLET(5 MG) BY MOUTH DAILY       Diabetes Medication Protocol Failed - 10/18/2024 10:36 AM        Failed - Last A1C < 7.5 and within past 6 months     Lab Results   Component Value Date    A1C 6.7 (H) 07/02/2020             Failed - In person appointment or virtual visit in the past 6 mos or appointment in next 3 mos     Recent Outpatient Visits              7 months ago Uncontrolled type 2 diabetes mellitus with hyperglycemia (HCC)    [facility] Azul Knight APRN    Office Visit    8 months ago [facility]    [facility]    4 years ago [facility]    Office Visit    5 years ago [facility] breast    [facility]    Office Visit    6 years ago Erythema    Dermatology - [facility] Meg Michael MD    Office Visit          Future Appointments         Provider Department Appt Notes    In 1 month Azul Knight APRN [facility] Diabetes follow up   (Policy informed)                    Failed - Microalbumin procedure in past 12 months or taking ACE/ARB        Failed - EGFRCR or GFRNAA > 50     GFR Evaluation            Failed - GFR in the past 12 months                Future Appointments         Provider Department Appt Notes    In 1 month Azul Knight APRN [facility], [facility] Diabetes follow up   (Policy informed)          Recent Outpatient Visits              7 months ago Uncontrolled type 2 diabetes mellitus with hyperglycemia (HCC)    [facility], [facility], Azul Borrero APRN    Office Visit    8 months ago Abnormal EKG    [facility], [facility], Sandra Martinez MD    Office Visit    4 years ago Encounter for screening colonoscopy    [facility], [facility], Sandra Martinez MD    Office Visit    5 years ago Discharge from breast    [facility], [facility], Sandra Martinez MD    Office Visit    6 years ago Erythema    Dermatology - [facility] Meg Mchugh MD    Office Visit

## 2024-11-26 ENCOUNTER — OFFICE VISIT (OUTPATIENT)
Dept: FAMILY MEDICINE CLINIC | Facility: CLINIC | Age: 64
End: 2024-11-26

## 2024-11-26 VITALS
HEART RATE: 76 BPM | SYSTOLIC BLOOD PRESSURE: 147 MMHG | DIASTOLIC BLOOD PRESSURE: 75 MMHG | HEIGHT: 67 IN | TEMPERATURE: 98 F | BODY MASS INDEX: 38.77 KG/M2 | WEIGHT: 247 LBS | OXYGEN SATURATION: 94 % | RESPIRATION RATE: 20 BRPM

## 2024-11-26 DIAGNOSIS — Z12.11 SCREENING FOR COLON CANCER: ICD-10-CM

## 2024-11-26 DIAGNOSIS — Z13.6 SCREENING FOR HEART DISEASE: ICD-10-CM

## 2024-11-26 DIAGNOSIS — D35.2 PITUITARY MICROADENOMA (HCC): ICD-10-CM

## 2024-11-26 DIAGNOSIS — Z00.00 ANNUAL PHYSICAL EXAM: ICD-10-CM

## 2024-11-26 DIAGNOSIS — E11.9 DIABETES MELLITUS TYPE 2 IN NONOBESE (HCC): Primary | ICD-10-CM

## 2024-11-26 LAB — HEMOGLOBIN A1C: 7.7 % (ref 4.3–5.6)

## 2024-11-26 PROCEDURE — 99213 OFFICE O/P EST LOW 20 MIN: CPT | Performed by: FAMILY MEDICINE

## 2024-11-26 PROCEDURE — 99396 PREV VISIT EST AGE 40-64: CPT | Performed by: FAMILY MEDICINE

## 2024-11-26 PROCEDURE — 83036 HEMOGLOBIN GLYCOSYLATED A1C: CPT | Performed by: FAMILY MEDICINE

## 2024-11-26 RX ORDER — METFORMIN HYDROCHLORIDE 500 MG/1
1000 TABLET, EXTENDED RELEASE ORAL 2 TIMES DAILY WITH MEALS
Qty: 360 TABLET | Refills: 1 | Status: SHIPPED | OUTPATIENT
Start: 2024-11-26 | End: 2025-05-25

## 2024-11-26 RX ORDER — AMLODIPINE AND BENAZEPRIL HYDROCHLORIDE 5; 20 MG/1; MG/1
1 CAPSULE ORAL DAILY
Qty: 90 CAPSULE | Refills: 1 | Status: SHIPPED | OUTPATIENT
Start: 2024-11-26

## 2024-11-26 NOTE — PROGRESS NOTES
Subjective:   Patient ID: Britney Benjamin is a 64 year old female.    HPI  Patient here for annual physical   Also f/u diabetes -HbA1C today is 7.7  Patient for f/u hypertension   Denies any chest pain shortness of breath or headaches.   Not Monitoring blood pressure at home and .   Also history of pituitary microadenoma needs repeat mri   History/Other:   Review of Systems    Constitutional: Negative.  Negative for activity change, appetite change, diaphoresis and fatigue.     Respiratory: Negative.  Negative for apnea, cough, chest tightness and shortness of breath.    Cardiovascular: Negative.  Negative for chest pain, palpitations and leg swelling.   Gastrointestinal: Negative.  Negative for abdominal pain.   Skin: Negative.           Psychiatric/Behavioral: Negative.        Current Outpatient Medications   Medication Sig Dispense Refill    metFORMIN  MG Oral Tablet 24 Hr Take 2 tablets (1,000 mg total) by mouth 2 (two) times daily with meals. 360 tablet 1    amLODIPine Besy-Benazepril HCl (LOTREL) 5-20 MG Oral Cap Take 1 capsule by mouth daily. 90 capsule 1    Continuous Glucose Sensor (DEXCOM G7 SENSOR) Does not apply Misc 1 each Every 10 days. 3 each 5    Continuous Blood Gluc Sensor (DEXCOM G7 SENSOR) Does not apply Misc 1 each Every 10 days. 3 each 3    Continuous Blood Gluc Sensor (DEXCOM G7 SENSOR) Does not apply Misc 1 each Every 10 days. 3 each 3     Allergies:Allergies[1]    Objective:   Physical Exam  Constitutional:       Appearance: She is well-developed.   Cardiovascular:      Rate and Rhythm: Normal rate and regular rhythm.      Heart sounds: Normal heart sounds.   Pulmonary:      Effort: Pulmonary effort is normal.      Breath sounds: Normal breath sounds.   Abdominal:      General: Bowel sounds are normal.      Palpations: Abdomen is soft.   Skin:     General: Skin is warm and dry.   Neurological:      Mental Status: She is alert.      Deep Tendon Reflexes: Reflexes are normal and symmetric.          Assessment & Plan:   1. Diabetes mellitus type 2 in nonobese (HCC)    2. Annual physical exam    3. Screening for colon cancer    4. Pituitary microadenoma (HCC)    5. Screening for heart disease    Switch to metformin   Increase lotrel   Do mru pituitary   F/u in 3 months     Orders Placed This Encounter   Procedures    POC Hemoglobin A1C    Lipid Panel    Comp Metabolic Panel (14)    CBC W Differential W Platelet [E]    Microalb/Creat Ratio, Random Urine [E]    Prolactin [E]       Meds This Visit:  Requested Prescriptions     Signed Prescriptions Disp Refills    metFORMIN  MG Oral Tablet 24 Hr 360 tablet 1     Sig: Take 2 tablets (1,000 mg total) by mouth 2 (two) times daily with meals.    amLODIPine Besy-Benazepril HCl (LOTREL) 5-20 MG Oral Cap 90 capsule 1     Sig: Take 1 capsule by mouth daily.       Imaging & Referrals:  OPHTHALMOLOGY - INTERNAL  OP REFERRAL TO [facility] GI TELEPHONE COLON SCREEN  MRI BRAIN (CPT=70551)  CT CALCIUM SCORING         [1] No Known Allergies

## 2025-01-25 ENCOUNTER — TELEPHONE (OUTPATIENT)
Dept: ENDOCRINOLOGY CLINIC | Facility: CLINIC | Age: 65
End: 2025-01-25

## 2025-01-25 NOTE — TELEPHONE ENCOUNTER
Current Outpatient Medications   Medication Sig Dispense Refill    Continuous Blood Gluc Sensor (DEXCOM G7 SENSOR) Does not apply Misc 1 each Every 10 days. 3 each 3     Prior Authorization needed:  Key [id number]  Last Name: Cassidy  :04/15/1960    Complete the PA and click \"Send to Plan\" for approval    [facility]  [phone]-Phone  [phone]-Fax

## 2025-01-27 NOTE — TELEPHONE ENCOUNTER
Medication PA Requested:  Continuous Blood Gluc Sensor (DEXCOM G7 SENSOR) Does not apply Misc                                                          CoverMyMeds Used: yes  Key:[id number]   Quantity: 3 each  Day Supply: 30  Si each Every 10 days   DX Code: E11.65

## 2025-02-05 NOTE — TELEPHONE ENCOUNTER
Medication PA Requested:  Continuous Blood Gluc Sensor (DEXCOM G7 SENSOR) Does not apply Misc                                                          CoverMyMeds Used: yes  Key:[id number]   Quantity: 3 each  Day Supply: 30  Si each Every 10 days   DX Code: E11.65     Cover My Meds pending submitted, last office visit  and A1C   Awaiting determination

## 2025-04-23 RX ORDER — GLIPIZIDE 5 MG/1
5 TABLET, FILM COATED, EXTENDED RELEASE ORAL DAILY
Qty: 90 TABLET | Refills: 3 | Status: SHIPPED | OUTPATIENT
Start: 2025-04-23

## 2025-04-23 NOTE — TELEPHONE ENCOUNTER
Please review.  Protocol failed/has no protocol    Last Office Visit: 11/26/2024  Patient did not complete labs was placed on 11/26/2024. [employer] message to complete outstanding labs.     The original prescription was discontinued on 11/26/2024 by Sandra Trujillo MD.    Please advise if refill is appropriate.    Requested Prescriptions     Pending Prescriptions Disp Refills    GLIPIZIDE ER 5 MG Oral Tablet 24 Hr [Pharmacy Med Name: GLIPIZIDE ER 5MG TABLETS] 90 tablet 0     Sig: TAKE 1 TABLET(5 MG) BY MOUTH DAILY

## (undated) NOTE — LETTER
10/11/19        [id number] [facility]      Dear Riana Lynn,    [address] records indicate that you have outstanding lab work and or testing that was ordered for you and has not yet been completed:  Orders Placed This Encounter      Comp

## (undated) NOTE — LETTER
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 Based on this examination pending sleep study she is fit to go back to work. If you have questionsios please contact me.

## (undated) NOTE — LETTER
02/20/21        [id number] [facility]      Dear Tiffani Beckham,    [address] [facility] records indicate that you have outstanding lab work and or testing that was ordered for you and has not yet been completed:  Orders Placed This Encounter      [facility]

## (undated) NOTE — LETTER
11/19/20        [id number] [facility]      Dear Rayray Chapman,    [address] [facility] records indicate that you have outstanding lab work and or testing that was ordered for you and has not yet been completed:  Orders Placed This Encounter      [facility]

## (undated) NOTE — LETTER
05/20/21        [address] [facility]      Dear [facility],    [address] [facility] records indicate that you have outstanding lab work and or testing that was ordered for you and has not yet been completed:  Orders Placed This Encounter      [facility]

## (undated) NOTE — LETTER
02/22/24        Britney Benjamin  [address] [facility] [address]      Dear Britney,    Our records indicate that you have outstanding lab work and or testing that was ordered for you and has not yet been completed:  Orders Placed This Encounter      Comp Metabolic Panel (14)      Hemoglobin A1C      Lipid Panel      CBC With Differential With Platelet      Microalb/Creat Ratio, Random Urine [E]      XR CHEST PA + LAT CHEST ([facility]=41855)    To provide you with the best possible care, please complete these orders at your earliest convenience. If you have recently completed these orders please disregard this letter.     If you have any questions please call the office at Dept: [phone].     Thank you,       Sandra Trujillo MD